# Patient Record
(demographics unavailable — no encounter records)

---

## 2024-10-07 NOTE — HISTORY OF PRESENT ILLNESS
[de-identified] : CANDE OWEN is a 19 year old female who presents to the [facility] for follow up of her laryngeal hypersensitivity, GERD, emesis, anxiety and chronic cough. I last saw the patient on 7/26/24. At that time, cough was gone for 2 weeks after I last saw her but had been present for last 2 weeks and she associates with anxiety. Cough was only occurring every other day on average and typically after meals were completed and sometimes associated with emesis. I had referred patient to Dr. Jenkins to consider gastric emptying study or other GI testing and I deferred to her on that. I wanted patient to undergo laryngeal desensitization like we would do for a chronic cough patient. She was to see Tigist Raygoza and RTC when done with Tigist. At that time, I had also referred to psychiatry and psychology for her anxiety. Currently seeing therapist, only completed two sessions. Continues to have globus and choking sensation, no improvement since last visit. Due for endoscopy with BRAVO and endoFLIP with Dr. Jenkins GI. Continues to cough up clear mucous, mainly after eating, rare in frequency. Recent 10lb weight loss since August. Still has reflux symptoms, wants to increase her pantoprazole. Was in the ER 8/2024 and 9/2024 for facial flushing, difficulty swallow and throat tightness. Did not have anaphylaxis, but following up with allergist. Unsure of cause still. Normal histamine levels.   Previously reported: difficulty swallowing and globus for the past 3 weeks. States she went to [place] 3 weeks ago and since then she has had severe allergies, went to ER due to trouble breathing, takes Claritin daily. States every time she eats she feels like she has something in her throat causing her to cough and gag. They do note altering their diet to avoid troublesome consistencies. Avoids acidic foods. They do note regurgitation of recently eaten foods. Happens very rarely if she eats something acidic or spicy. They do NOT note recent weight loss. They do note frequent classic reflux symptoms. Takes Pepcid PRN.

## 2024-10-07 NOTE — ASSESSMENT
[FreeTextEntry1] : Assessment/Plan: #1 Laryngeal hypersensitivity #2 GERD #3 Chronic cough- now resolved #4 Anxiety  Will continue Pantoprazole 20mg daily. Will continue with therapist for anxiety, which could be contributing to globus and choking sensation. Cough is essentially gone at this point. I will do telemed appointment with her in mid November to allow enough time for GI testing and report to be finished.  If nothing significant found we may consider gabapentin. At recommendation of her psychologist she will also make appointment in near future for psychiatry for consideration of medication.   For her nasal congestion we will start flonase 2 puffs each nostril daily. The risks, benefits, and alternatives to care were discussed with the patient and understanding expressed.

## 2024-10-07 NOTE — PROCEDURE
[de-identified] : Procedure: Transnasal flexible laryngoscopy  Description: Informed consent was obtained from the patient prior to the procedure. The patient was seated in the clinic chair. Topical anesthesia was achieved by first spraying the nasal cavities with 4% lidocaine and 1% phenylephrine.   Exam: This demonstrates a clear vallecula and crisp epiglottis. The aryepiglottic folds are intact and symmetric bilaterally. The hypopharynx does not demonstrate pooling. The interarytenoid space demonstrates no lesions. It does not demonstrate pachydermia. The false vocal folds are symmetric and without lesions or masses. False fold voicing (plica ventricularis) is not noted today. The true vocal folds show normal and symmetric motion bilaterally. There is no paradoxical motion. The right medial edge is crisp and shows no lesions or masses. The left medial edge is crisp and shows no lesions or masses. The mucosal covering is minimally edematous. There is not erythema present today. There are no obvious vascular ectasias present. The vocal processes do not demonstrate granulomas. The subglottis and proximal trachea is clear and unobstructed to the limits of the examination today.

## 2024-10-07 NOTE — HISTORY OF PRESENT ILLNESS
[de-identified] : CANDE OWEN is a 19 year old female who presents to the [facility] for follow up of her laryngeal hypersensitivity, GERD, emesis, anxiety and chronic cough. I last saw the patient on 7/26/24. At that time, cough was gone for 2 weeks after I last saw her but had been present for last 2 weeks and she associates with anxiety. Cough was only occurring every other day on average and typically after meals were completed and sometimes associated with emesis. I had referred patient to Dr. Jenkins to consider gastric emptying study or other GI testing and I deferred to her on that. I wanted patient to undergo laryngeal desensitization like we would do for a chronic cough patient. She was to see Tigist Raygoza and RTC when done with Tigist. At that time, I had also referred to psychiatry and psychology for her anxiety. Currently seeing therapist, only completed two sessions. Continues to have globus and choking sensation, no improvement since last visit. Due for endoscopy with BRAVO and endoFLIP with Dr. Jenkins GI. Continues to cough up clear mucous, mainly after eating, rare in frequency. Recent 10lb weight loss since August. Still has reflux symptoms, wants to increase her pantoprazole. Was in the ER 8/2024 and 9/2024 for facial flushing, difficulty swallow and throat tightness. Did not have anaphylaxis, but following up with allergist. Unsure of cause still. Normal histamine levels.   Previously reported: difficulty swallowing and globus for the past 3 weeks. States she went to [place] 3 weeks ago and since then she has had severe allergies, went to ER due to trouble breathing, takes Claritin daily. States every time she eats she feels like she has something in her throat causing her to cough and gag. They do note altering their diet to avoid troublesome consistencies. Avoids acidic foods. They do note regurgitation of recently eaten foods. Happens very rarely if she eats something acidic or spicy. They do NOT note recent weight loss. They do note frequent classic reflux symptoms. Takes Pepcid PRN.

## 2024-10-07 NOTE — REASON FOR VISIT
[Subsequent Evaluation] : a subsequent evaluation for
[Subsequent Evaluation] : a subsequent evaluation for
none

## 2024-10-07 NOTE — PROCEDURE
[de-identified] : Procedure: Transnasal flexible laryngoscopy  Description: Informed consent was obtained from the patient prior to the procedure. The patient was seated in the clinic chair. Topical anesthesia was achieved by first spraying the nasal cavities with 4% lidocaine and 1% phenylephrine.   Exam: This demonstrates a clear vallecula and crisp epiglottis. The aryepiglottic folds are intact and symmetric bilaterally. The hypopharynx does not demonstrate pooling. The interarytenoid space demonstrates no lesions. It does not demonstrate pachydermia. The false vocal folds are symmetric and without lesions or masses. False fold voicing (plica ventricularis) is not noted today. The true vocal folds show normal and symmetric motion bilaterally. There is no paradoxical motion. The right medial edge is crisp and shows no lesions or masses. The left medial edge is crisp and shows no lesions or masses. The mucosal covering is minimally edematous. There is not erythema present today. There are no obvious vascular ectasias present. The vocal processes do not demonstrate granulomas. The subglottis and proximal trachea is clear and unobstructed to the limits of the examination today.

## 2024-11-18 NOTE — HISTORY OF PRESENT ILLNESS
[de-identified] : CANDE OWEN is a 19 year old female who presents to the [facility] for follow up of her laryngeal hypersensitivity, GERD, anxiety and chronic cough--resolved. I last saw the patient on 10/7/24. At that time, she was to continue PPI, therapy for anxiety, and was to finish GI testing. We were to consider gabapentin if negative workup and psychiatry for consideration of medication for anxiety. Flonase was started as well.  Previously reported: difficulty swallowing and globus for the past 3 weeks. States she went to [place] 3 weeks ago and since then she has had severe allergies, went to ER due to trouble breathing, takes Claritin daily. States every time she eats she feels like she has something in her throat causing her to cough and gag. They do note altering their diet to avoid troublesome consistencies. Avoids acidic foods. They do note regurgitation of recently eaten foods. Happens very rarely if she eats something acidic or spicy. They do NOT note recent weight loss. They do note frequent classic reflux symptoms. Takes Pepcid PRN.

## 2024-11-18 NOTE — HISTORY OF PRESENT ILLNESS
[de-identified] : CANDE OWEN is a 19 year old female who presents to the [facility] for follow up of her laryngeal hypersensitivity, GERD, anxiety and chronic cough--resolved. I last saw the patient on 10/7/24. At that time, she was to continue PPI, therapy for anxiety, and was to finish GI testing. We were to consider gabapentin if negative workup and psychiatry for consideration of medication for anxiety. Flonase was started as well.  Previously reported: difficulty swallowing and globus for the past 3 weeks. States she went to [place] 3 weeks ago and since then she has had severe allergies, went to ER due to trouble breathing, takes Claritin daily. States every time she eats she feels like she has something in her throat causing her to cough and gag. They do note altering their diet to avoid troublesome consistencies. Avoids acidic foods. They do note regurgitation of recently eaten foods. Happens very rarely if she eats something acidic or spicy. They do NOT note recent weight loss. They do note frequent classic reflux symptoms. Takes Pepcid PRN.

## 2025-01-22 NOTE — ASSESSMENT
[FreeTextEntry1] : 20 y/o female presents today for initial evaluation of vomiting and gagging after meals. Referred by ENT, recent trip to [place] s/p strep throat infection. Diagnosed with laryngeal hypersensitivity and globus sensation by ENT due to strep throat. Prescribed omeprazole for acid reflux for approximately 1 month, symptoms returned. Patient notes that symptoms are always worse with acidic foods. Denies allergies. Reports reaction to famotidine once-redness. Scheduled to see speech therapist end of the month. Last EGD  and negative, as per patient. Currently not taking omeprazole. Patient also reports history of anxiety and panic attacks.  Denies sob, cp, abdominal pain or altered BMs.   RTO 24- currently on pantoprazole 20 mg x 2 weeks ; sxs worsened with anxiety.; had facial flushing with omeprazole. ON zoloft 25 mg x 2 weeks .Had egd and endooflip with Dr. Jenkins. Hill grade 3. No food allergies. Esophageal bx normal.Has dermatographia. Mother states pt had gerd/regurgitation as a .  Plan: 1.increase pantoprazole to 40 mg daily 2. Pt to review with Psychiatrist increasing zoloft to 50 mg daily 3. LIfe-style changes reviewed 4. No indication for fundiplication 5. RTO 4 weeks

## 2025-01-22 NOTE — HISTORY OF PRESENT ILLNESS
[FreeTextEntry1] : 20 y/o female presents today for initial evaluation of vomiting and gagging after meals. Referred by ENT, recent trip to [place] s/p strep throat infection. Diagnosed with laryngeal hypersensitivity and globus sensation by ENT due to strep throat. Prescribed omeprazole for acid reflux for approximately 1 month, symptoms returned. Patient notes that symptoms are always worse with acidic foods. Denies allergies. Reports reaction to famotidine once-redness. Scheduled to see speech therapist end of the month. Last EGD  and negative, as per patient. Currently not taking omeprazole. Patient also reports history of anxiety and panic attacks.  Denies sob, cp, abdominal pain or altered BMs.   RTO 24- currently on pantoprazole 20 mg x 2 weeks ; sxs worsened with anxiety.; had facial flushing with omeprazole. ON zoloft 25 mg x 2 weeks .Had egd and endooflip with Dr. Jenkins. Hill grade 3. No food allergies. Esophageal bx normal.Has dermatographia. Mother states pt had gerd/regurgitation as a .

## 2025-01-22 NOTE — REASON FOR VISIT
[Initial Evaluation] : an initial evaluation [Parent] : parent [FreeTextEntry1] : gerd, regurgitation

## 2025-03-13 NOTE — ASSESSMENT
[FreeTextEntry1] : Assessment/Plan: #1 Laryngeal hypersensitivity/globus #2 GERD #3 Chronic cough- now resolved #4 Anxiety #5 Chronic throat clearing  Will continue with therapist for anxiety, which could be contributing to globus and choking sensation. Will continue with Zoloft 25mg from psychiatry. May switch to Lexapro or Prozac in future. Will follow up with Dr. Lopez and will likely restart Pantoprazole as was doing better on this medication. Recommended we hold off of gabapentin as still trying with SSRI/SNRI to get full resolution.  Will follow up via telemed in aprox 6 months.  This appointment was conducted via real-time audio/video technology from [facility] to the patient in their home. I spent a combined total of 15 minutes on this patient's care today, including face-to-face and non face-to-face time.

## 2025-03-13 NOTE — ASSESSMENT
[FreeTextEntry1] : Assessment/Plan: #1 Laryngeal hypersensitivity/globus #2 GERD #3 Chronic cough- now resolved #4 Anxiety #5 Chronic throat clearing  Will continue with therapist for anxiety, which could be contributing to globus and choking sensation. Will continue with Zoloft 25mg from psychiatry. May switch to Lexapro or Prozac in future. Will follow up with Dr. Lopez and will likely restart Pantoprazole as was doing better on this medication. Recommended we hold off of gabapentin as still trying with SSRI/SNRI to get full resolution.  Will follow up via telemed in aprox 6 months.  This appointment was conducted via real-time audio/video technology from [facility] to the patient in their home. I spent a combined total of 15 minutes on this patient's care today, including face-to-face and non face-to-face time.

## 2025-03-13 NOTE — HISTORY OF PRESENT ILLNESS
[de-identified] : CANDE OWEN is a 19 year old female who presents to the [facility] for follow up of her laryngeal hypersensitivity/globus, GERD, chronic throat clearing, anxiety and chronic cough--resolved. I last saw the patient on 11/21/24. At that time, she was to continue PPI, therapy for anxiety, flonase PRN, sips of water, and schedule appt with psychiatry. She was to follow up with Dr. Jenkins and follow up in 3mths via telemed. Had trouble getting appointment scheduled so instead scheduled to see Dr. Alan Lopez. States doing significantly better (apart from 1 week) with the Zoloft.   Previously reported: difficulty swallowing and globus for the past 3 weeks. States she went to [place] 3 weeks ago and since then she has had severe allergies, went to ER due to trouble breathing, takes Claritin daily. States every time she eats she feels like she has something in her throat causing her to cough and gag. They do note altering their diet to avoid troublesome consistencies. Avoids acidic foods. They do note regurgitation of recently eaten foods. Happens very rarely if she eats something acidic or spicy. They do NOT note recent weight loss. They do note frequent classic reflux symptoms. Takes Pepcid PRN.

## 2025-03-13 NOTE — HISTORY OF PRESENT ILLNESS
[de-identified] : CANDE OWEN is a 19 year old female who presents to the [facility] for follow up of her laryngeal hypersensitivity/globus, GERD, chronic throat clearing, anxiety and chronic cough--resolved. I last saw the patient on 11/21/24. At that time, she was to continue PPI, therapy for anxiety, flonase PRN, sips of water, and schedule appt with psychiatry. She was to follow up with Dr. Jenkins and follow up in 3mths via telemed. Had trouble getting appointment scheduled so instead scheduled to see Dr. Alan Lopez. States doing significantly better (apart from 1 week) with the Zoloft.   Previously reported: difficulty swallowing and globus for the past 3 weeks. States she went to [place] 3 weeks ago and since then she has had severe allergies, went to ER due to trouble breathing, takes Claritin daily. States every time she eats she feels like she has something in her throat causing her to cough and gag. They do note altering their diet to avoid troublesome consistencies. Avoids acidic foods. They do note regurgitation of recently eaten foods. Happens very rarely if she eats something acidic or spicy. They do NOT note recent weight loss. They do note frequent classic reflux symptoms. Takes Pepcid PRN.